# Patient Record
Sex: FEMALE | Race: WHITE | NOT HISPANIC OR LATINO
[De-identification: names, ages, dates, MRNs, and addresses within clinical notes are randomized per-mention and may not be internally consistent; named-entity substitution may affect disease eponyms.]

---

## 2021-10-01 ENCOUNTER — TRANSCRIPTION ENCOUNTER (OUTPATIENT)
Age: 56
End: 2021-10-01

## 2021-10-07 ENCOUNTER — RESULT REVIEW (OUTPATIENT)
Age: 56
End: 2021-10-07

## 2021-10-07 ENCOUNTER — OUTPATIENT (OUTPATIENT)
Dept: OUTPATIENT SERVICES | Facility: HOSPITAL | Age: 56
LOS: 1 days | End: 2021-10-07
Payer: MEDICAID

## 2021-10-07 DIAGNOSIS — C50.419 MALIGNANT NEOPLASM OF UPPER-OUTER QUADRANT OF UNSPECIFIED FEMALE BREAST: ICD-10-CM

## 2021-10-07 PROCEDURE — 88321 CONSLTJ&REPRT SLD PREP ELSWR: CPT

## 2021-10-08 LAB — SURGICAL PATHOLOGY STUDY: SIGNIFICANT CHANGE UP

## 2021-10-10 ENCOUNTER — TRANSCRIPTION ENCOUNTER (OUTPATIENT)
Age: 56
End: 2021-10-10

## 2021-10-11 ENCOUNTER — OUTPATIENT (OUTPATIENT)
Dept: OUTPATIENT SERVICES | Facility: HOSPITAL | Age: 56
LOS: 1 days | Discharge: HOME | End: 2021-10-11
Payer: MEDICAID

## 2021-10-11 ENCOUNTER — OUTPATIENT (OUTPATIENT)
Dept: OUTPATIENT SERVICES | Facility: HOSPITAL | Age: 56
LOS: 1 days | End: 2021-10-11
Payer: MEDICAID

## 2021-10-11 ENCOUNTER — RESULT REVIEW (OUTPATIENT)
Age: 56
End: 2021-10-11

## 2021-10-11 PROCEDURE — 76098 X-RAY EXAM SURGICAL SPECIMEN: CPT

## 2021-10-11 PROCEDURE — 78195 LYMPH SYSTEM IMAGING: CPT | Mod: 26

## 2021-10-11 PROCEDURE — 88342 IMHCHEM/IMCYTCHM 1ST ANTB: CPT | Mod: 26

## 2021-10-11 PROCEDURE — 88307 TISSUE EXAM BY PATHOLOGIST: CPT | Mod: 26

## 2021-10-11 PROCEDURE — 76098 X-RAY EXAM SURGICAL SPECIMEN: CPT | Mod: 26

## 2021-10-11 PROCEDURE — A9541: CPT

## 2021-10-11 PROCEDURE — 78195 LYMPH SYSTEM IMAGING: CPT

## 2021-10-11 PROCEDURE — 88341 IMHCHEM/IMCYTCHM EA ADD ANTB: CPT | Mod: 26

## 2021-10-11 RX ORDER — OXYCODONE AND ACETAMINOPHEN 5; 325 MG/1; MG/1
1 TABLET ORAL
Qty: 10 | Refills: 0
Start: 2021-10-11 | End: 2021-10-12

## 2021-10-16 LAB — SURGICAL PATHOLOGY STUDY: SIGNIFICANT CHANGE UP

## 2021-10-25 PROBLEM — Z00.00 ENCOUNTER FOR PREVENTIVE HEALTH EXAMINATION: Status: ACTIVE | Noted: 2021-10-25

## 2021-11-02 ENCOUNTER — NON-APPOINTMENT (OUTPATIENT)
Age: 56
End: 2021-11-02

## 2021-11-03 ENCOUNTER — APPOINTMENT (OUTPATIENT)
Dept: RADIATION ONCOLOGY | Facility: CLINIC | Age: 56
End: 2021-11-03
Payer: MEDICAID

## 2021-11-03 DIAGNOSIS — Z80.0 FAMILY HISTORY OF MALIGNANT NEOPLASM OF DIGESTIVE ORGANS: ICD-10-CM

## 2021-11-03 DIAGNOSIS — Z78.9 OTHER SPECIFIED HEALTH STATUS: ICD-10-CM

## 2021-11-03 PROCEDURE — 99204 OFFICE O/P NEW MOD 45 MIN: CPT | Mod: 25

## 2021-11-04 ENCOUNTER — FORM ENCOUNTER (OUTPATIENT)
Age: 56
End: 2021-11-04

## 2021-11-04 PROBLEM — Z80.0 FAMILY HISTORY OF MALIGNANT NEOPLASM OF STOMACH: Status: ACTIVE | Noted: 2021-11-04

## 2021-11-04 PROBLEM — Z78.9 NO PERTINENT PAST MEDICAL HISTORY: Status: RESOLVED | Noted: 2021-11-04 | Resolved: 2021-11-04

## 2021-11-07 NOTE — HISTORY OF PRESENT ILLNESS
[FreeTextEntry1] : 54 y/o female with h/o right breast lumpectomy 10/2021 for invasive ductal carcinoma presents to discuss radiation options.\par \par Initially mammogram and US showed suspicious area in right breast.  \par \par 9/2021 --- MRI showed right breast lesion BI-RADS 6. \par \par 10/11/2021 -- right lumpectomy showed invasive ductal carcinoma. Overall grade 1. pT1aN0.  ER positive, [place] negative, Her2 negative.  Breast surgeon Dr Lopez\par Final Diagnosis\par 1. Right sentinel lymph node #1, resection: - Two lymph node, negative for carcinoma (0/2). Note: Cytokeratin stain is negative supporting the diagnosis.\par 2. Right sentinel lymph node # 2, resection: - One lymph node, negative for carcinoma (0/1). Note: Cytokeratin stain is negative supporting the diagnosis.\par 3. Right sentinel lymph node #3, resection: - One lymph node, negative for carcinoma (0/1). Note: Cytokeratin stain is negative supporting the diagnosis.\par 4. Breast, right, lumpectomy: - Invasive duct carcinoma, well-differentiated, 0.45 cm. - The carcinoma is seen close to previous biopsy site. - Lymphovascular invasion is not seen. - All resection margins are negative for carcinoma, >5mm. - Atypical lobular hyperplasia. - Fibrocystic changes. - Fibroadenoma. - Microcalcifications are seen associated with benign ducts.\par \par \par

## 2021-11-07 NOTE — PHYSICAL EXAM
[Normal] : oriented to person, place and time, the affect was normal, the mood was normal and not anxious [de-identified] : R breast incision clean

## 2021-11-10 ENCOUNTER — NON-APPOINTMENT (OUTPATIENT)
Age: 56
End: 2021-11-10

## 2021-12-03 ENCOUNTER — NON-APPOINTMENT (OUTPATIENT)
Age: 56
End: 2021-12-03

## 2021-12-09 ENCOUNTER — NON-APPOINTMENT (OUTPATIENT)
Age: 56
End: 2021-12-09

## 2021-12-15 NOTE — REASON FOR VISIT
[Routine On-Treatment] : a routine on-treatment visit for [Breast Cancer] : breast cancer [[employer] ] : provided by [employer]   [Interpreters_IDNumber] : [id number] [TWNoteComboBox1] : [place]

## 2021-12-15 NOTE — HISTORY OF PRESENT ILLNESS
[FreeTextEntry1] : 54 y/o female with h/o right breast lumpectomy 10/2021 for invasive ductal carcinoma presents to discuss radiation options. Radiation planned for 5240 cGy in 20 Fx. \par \par Initially mammogram and US showed suspicious area in right breast.  \par \par 9/2021 --- MRI showed right breast lesion BI-RADS 6. \par \par 10/11/2021 -- right lumpectomy showed invasive ductal carcinoma. Overall grade 1. pT1aN0.  ER positive, [place] negative, Her2 negative.  Breast surgeon Dr Lopez\par Final Diagnosis\par 1. Right sentinel lymph node #1, resection: - Two lymph node, negative for carcinoma (0/2). Note: Cytokeratin stain is negative supporting the diagnosis.\par 2. Right sentinel lymph node # 2, resection: - One lymph node, negative for carcinoma (0/1). Note: Cytokeratin stain is negative supporting the diagnosis.\par 3. Right sentinel lymph node #3, resection: - One lymph node, negative for carcinoma (0/1). Note: Cytokeratin stain is negative supporting the diagnosis.\par 4. Breast, right, lumpectomy: - Invasive duct carcinoma, well-differentiated, 0.45 cm. - The carcinoma is seen close to previous biopsy site. - Lymphovascular invasion is not seen. - All resection margins are negative for carcinoma, >5mm. - Atypical lobular hyperplasia. - Fibrocystic changes. - Fibroadenoma. - Microcalcifications are seen associated with benign ducts.\par \par 9/20 fractions of radiation to right breast completed. Using Calendula cream over RT area. No cough, chest pain. \par \par \par

## 2021-12-15 NOTE — REVIEW OF SYSTEMS
[Dysphagia: Grade 0] : Dysphagia: Grade 0 [Cough: Grade 0] : Cough: Grade 0 [Dyspnea: Grade 0] : Dyspnea: Grade 0 [Alopecia: Grade 0] : Alopecia: Grade 0 [Pruritus: Grade 0] : Pruritus: Grade 0 [Skin Atrophy: Grade 0] : Skin Atrophy: Grade 0 [Skin Hyperpigmentation: Grade 0] : Skin Hyperpigmentation: Grade 0 [Skin Induration: Grade 0] : Skin Induration: Grade 0 [Dermatitis Radiation: Grade 0] : Dermatitis Radiation: Grade 0 [Negative] : Allergic/Immunologic [de-identified] : right breast lumpectomy for cancer 10/2021

## 2021-12-15 NOTE — DISEASE MANAGEMENT
[Clinical] : TNM Stage: c [I] : I [TTNM] : 1 [NTNM] : 0 [MTNM] : 0 [de-identified] : 5240 cGy [de-identified] : right breast

## 2021-12-16 ENCOUNTER — NON-APPOINTMENT (OUTPATIENT)
Age: 56
End: 2021-12-16

## 2021-12-21 NOTE — DISEASE MANAGEMENT
[Clinical] : TNM Stage: c [I] : I [TTNM] : 1 [NTNM] : 0 [MTNM] : 0 [de-identified] : 5240 cGy [de-identified] : right breast

## 2021-12-21 NOTE — DISEASE MANAGEMENT
[Clinical] : TNM Stage: c [I] : I [TTNM] : 1 [NTNM] : 0 [MTNM] : 0 [de-identified] : 5240 cGy [de-identified] : right breast

## 2021-12-21 NOTE — REVIEW OF SYSTEMS
[Dysphagia: Grade 0] : Dysphagia: Grade 0 [Cough: Grade 0] : Cough: Grade 0 [Dyspnea: Grade 0] : Dyspnea: Grade 0 [Alopecia: Grade 0] : Alopecia: Grade 0 [Pruritus: Grade 0] : Pruritus: Grade 0 [Skin Atrophy: Grade 0] : Skin Atrophy: Grade 0 [Skin Hyperpigmentation: Grade 0] : Skin Hyperpigmentation: Grade 0 [Skin Induration: Grade 0] : Skin Induration: Grade 0 [Dermatitis Radiation: Grade 0] : Dermatitis Radiation: Grade 0 [Negative] : Allergic/Immunologic [de-identified] : right breast lumpectomy for cancer 10/2021

## 2021-12-21 NOTE — REVIEW OF SYSTEMS
[Dysphagia: Grade 0] : Dysphagia: Grade 0 [Cough: Grade 0] : Cough: Grade 0 [Dyspnea: Grade 0] : Dyspnea: Grade 0 [Alopecia: Grade 0] : Alopecia: Grade 0 [Pruritus: Grade 0] : Pruritus: Grade 0 [Skin Atrophy: Grade 0] : Skin Atrophy: Grade 0 [Skin Hyperpigmentation: Grade 1 - Hyperpigmentation covering <10% BSA; no psychosocial impact] : Skin Hyperpigmentation: Grade 1 - Hyperpigmentation covering <10% BSA; no psychosocial impact [Skin Induration: Grade 0] : Skin Induration: Grade 0 [Dermatitis Radiation: Grade 1 - Faint erythema or dry desquamation] : Dermatitis Radiation: Grade 1 - Faint erythema or dry desquamation [Negative] : Allergic/Immunologic [de-identified] : right breast lumpectomy for cancer 10/2021

## 2021-12-21 NOTE — REASON FOR VISIT
[Routine On-Treatment] : a routine on-treatment visit for [Breast Cancer] : breast cancer [[employer] ] : provided by [employer]   [Interpreters_IDNumber] : [id number] [TWNoteComboBox1] : [place]

## 2021-12-21 NOTE — HISTORY OF PRESENT ILLNESS
[FreeTextEntry1] : 56 y/o female with h/o right breast lumpectomy 10/2021 for invasive ductal carcinoma presents to discuss radiation options. Radiation planned for 5240 cGy in 20 Fx. \par \par Initially mammogram and US showed suspicious area in right breast.  \par \par 9/2021 --- MRI showed right breast lesion BI-RADS 6. \par \par 10/11/2021 -- right lumpectomy showed invasive ductal carcinoma. Overall grade 1. pT1aN0.  ER positive, [place] negative, Her2 negative.  Breast surgeon Dr Lopez\par Final Diagnosis\par 1. Right sentinel lymph node #1, resection: - Two lymph node, negative for carcinoma (0/2). Note: Cytokeratin stain is negative supporting the diagnosis.\par 2. Right sentinel lymph node # 2, resection: - One lymph node, negative for carcinoma (0/1). Note: Cytokeratin stain is negative supporting the diagnosis.\par 3. Right sentinel lymph node #3, resection: - One lymph node, negative for carcinoma (0/1). Note: Cytokeratin stain is negative supporting the diagnosis.\par 4. Breast, right, lumpectomy: - Invasive duct carcinoma, well-differentiated, 0.45 cm. - The carcinoma is seen close to previous biopsy site. - Lymphovascular invasion is not seen. - All resection margins are negative for carcinoma, >5mm. - Atypical lobular hyperplasia. - Fibrocystic changes. - Fibroadenoma. - Microcalcifications are seen associated with benign ducts.\par \par 14/20 fractions of radiation to right breast completed. Using Calendula cream over RT area. No cough, chest pain, breast pain.\par \par \par

## 2021-12-21 NOTE — REASON FOR VISIT
[Routine On-Treatment] : a routine on-treatment visit for [Breast Cancer] : breast cancer [[employer] ] : provided by [employer]   [Interpreters_IDNumber] : [id number] [TWNoteComboBox1] : [place]

## 2021-12-21 NOTE — HISTORY OF PRESENT ILLNESS
[FreeTextEntry1] : 54 y/o female with h/o right breast lumpectomy 10/2021 for invasive ductal carcinoma presents to discuss radiation options. Radiation planned for 5240 cGy in 20 Fx. \par \par Initially mammogram and US showed suspicious area in right breast.  \par \par 9/2021 --- MRI showed right breast lesion BI-RADS 6. \par \par 10/11/2021 -- right lumpectomy showed invasive ductal carcinoma. Overall grade 1. pT1aN0.  ER positive, [place] negative, Her2 negative.  Breast surgeon Dr Lopez\par Final Diagnosis\par 1. Right sentinel lymph node #1, resection: - Two lymph node, negative for carcinoma (0/2). Note: Cytokeratin stain is negative supporting the diagnosis.\par 2. Right sentinel lymph node # 2, resection: - One lymph node, negative for carcinoma (0/1). Note: Cytokeratin stain is negative supporting the diagnosis.\par 3. Right sentinel lymph node #3, resection: - One lymph node, negative for carcinoma (0/1). Note: Cytokeratin stain is negative supporting the diagnosis.\par 4. Breast, right, lumpectomy: - Invasive duct carcinoma, well-differentiated, 0.45 cm. - The carcinoma is seen close to previous biopsy site. - Lymphovascular invasion is not seen. - All resection margins are negative for carcinoma, >5mm. - Atypical lobular hyperplasia. - Fibrocystic changes. - Fibroadenoma. - Microcalcifications are seen associated with benign ducts.\par \par 9/20 fractions of radiation to right breast completed. Using Calendula cream over RT area. No cough, chest pain. \par \par \par

## 2021-12-23 ENCOUNTER — NON-APPOINTMENT (OUTPATIENT)
Age: 56
End: 2021-12-23

## 2021-12-26 ENCOUNTER — FORM ENCOUNTER (OUTPATIENT)
Age: 56
End: 2021-12-26

## 2021-12-26 NOTE — REASON FOR VISIT
[Routine On-Treatment] : a routine on-treatment visit for [Breast Cancer] : breast cancer [[employer] ] : provided by [employer]   [Interpreters_IDNumber] : [id number] [TWNoteComboBox1] : French

## 2021-12-26 NOTE — DISEASE MANAGEMENT
[Clinical] : TNM Stage: c [I] : I [TTNM] : 1 [NTNM] : 0 [MTNM] : 0 [de-identified] : 5240 cGy [de-identified] : right breast

## 2021-12-26 NOTE — REVIEW OF SYSTEMS
[Dysphagia: Grade 0] : Dysphagia: Grade 0 [Cough: Grade 0] : Cough: Grade 0 [Dyspnea: Grade 0] : Dyspnea: Grade 0 [Alopecia: Grade 0] : Alopecia: Grade 0 [Pruritus: Grade 0] : Pruritus: Grade 0 [Skin Atrophy: Grade 0] : Skin Atrophy: Grade 0 [Skin Hyperpigmentation: Grade 1 - Hyperpigmentation covering <10% BSA; no psychosocial impact] : Skin Hyperpigmentation: Grade 1 - Hyperpigmentation covering <10% BSA; no psychosocial impact [Skin Induration: Grade 0] : Skin Induration: Grade 0 [Dermatitis Radiation: Grade 1 - Faint erythema or dry desquamation] : Dermatitis Radiation: Grade 1 - Faint erythema or dry desquamation [Negative] : Allergic/Immunologic [de-identified] : right breast lumpectomy for cancer 10/2021

## 2021-12-26 NOTE — HISTORY OF PRESENT ILLNESS
[FreeTextEntry1] : 54 y/o female with h/o right breast lumpectomy 10/2021 for invasive ductal carcinoma presents to discuss radiation options. Radiation planned for 5240 cGy in 20 Fx. \par \par Initially mammogram and US showed suspicious area in right breast.  \par \par 9/2021 --- MRI showed right breast lesion BI-RADS 6. \par \par 10/11/2021 -- right lumpectomy showed invasive ductal carcinoma. Overall grade 1. pT1aN0.  ER positive, MD negative, Her2 negative.  Breast surgeon Dr Lopez\par Final Diagnosis\par 1. Right sentinel lymph node #1, resection: - Two lymph node, negative for carcinoma (0/2). Note: Cytokeratin stain is negative supporting the diagnosis.\par 2. Right sentinel lymph node # 2, resection: - One lymph node, negative for carcinoma (0/1). Note: Cytokeratin stain is negative supporting the diagnosis.\par 3. Right sentinel lymph node #3, resection: - One lymph node, negative for carcinoma (0/1). Note: Cytokeratin stain is negative supporting the diagnosis.\par 4. Breast, right, lumpectomy: - Invasive duct carcinoma, well-differentiated, 0.45 cm. - The carcinoma is seen close to previous biopsy site. - Lymphovascular invasion is not seen. - All resection margins are negative for carcinoma, >5mm. - Atypical lobular hyperplasia. - Fibrocystic changes. - Fibroadenoma. - Microcalcifications are seen associated with benign ducts.\par \par 19/20 fractions of radiation to right breast completed. Using Calendula cream over RT area. No cough, chest pain, breast pain.\par \par \par

## 2021-12-27 ENCOUNTER — NON-APPOINTMENT (OUTPATIENT)
Age: 56
End: 2021-12-27

## 2022-01-04 NOTE — REVIEW OF SYSTEMS
[Dysphagia: Grade 0] : Dysphagia: Grade 0 [Cough: Grade 0] : Cough: Grade 0 [Dyspnea: Grade 0] : Dyspnea: Grade 0 [Alopecia: Grade 0] : Alopecia: Grade 0 [Pruritus: Grade 0] : Pruritus: Grade 0 [Skin Atrophy: Grade 0] : Skin Atrophy: Grade 0 [Skin Hyperpigmentation: Grade 1 - Hyperpigmentation covering <10% BSA; no psychosocial impact] : Skin Hyperpigmentation: Grade 1 - Hyperpigmentation covering <10% BSA; no psychosocial impact [Skin Induration: Grade 0] : Skin Induration: Grade 0 [Dermatitis Radiation: Grade 1 - Faint erythema or dry desquamation] : Dermatitis Radiation: Grade 1 - Faint erythema or dry desquamation [Negative] : Allergic/Immunologic [de-identified] : right breast lumpectomy for cancer 10/2021

## 2022-01-04 NOTE — DISEASE MANAGEMENT
[Clinical] : TNM Stage: c [I] : I [TTNM] : 1 [NTNM] : 0 [MTNM] : 0 [de-identified] : 5240 cGy [de-identified] : right breast

## 2022-01-04 NOTE — HISTORY OF PRESENT ILLNESS
[FreeTextEntry1] : 56 y/o female with h/o right breast lumpectomy 10/2021 for invasive ductal carcinoma presents to discuss radiation options. Radiation planned for 5240 cGy in 20 Fx. \par \par Initially mammogram and US showed suspicious area in right breast.  \par \par 9/2021 --- MRI showed right breast lesion BI-RADS 6. \par \par 10/11/2021 -- right lumpectomy showed invasive ductal carcinoma. Overall grade 1. pT1aN0.  ER positive, [place] negative, Her2 negative.  Breast surgeon Dr Lopez\par Final Diagnosis\par 1. Right sentinel lymph node #1, resection: - Two lymph node, negative for carcinoma (0/2). Note: Cytokeratin stain is negative supporting the diagnosis.\par 2. Right sentinel lymph node # 2, resection: - One lymph node, negative for carcinoma (0/1). Note: Cytokeratin stain is negative supporting the diagnosis.\par 3. Right sentinel lymph node #3, resection: - One lymph node, negative for carcinoma (0/1). Note: Cytokeratin stain is negative supporting the diagnosis.\par 4. Breast, right, lumpectomy: - Invasive duct carcinoma, well-differentiated, 0.45 cm. - The carcinoma is seen close to previous biopsy site. - Lymphovascular invasion is not seen. - All resection margins are negative for carcinoma, >5mm. - Atypical lobular hyperplasia. - Fibrocystic changes. - Fibroadenoma. - Microcalcifications are seen associated with benign ducts.\par \par 20/20 fractions of radiation to right breast completed. Using Calendula cream over RT area. No cough, chest pain, breast pain.\par \par \par

## 2022-01-04 NOTE — REASON FOR VISIT
[Routine On-Treatment] : a routine on-treatment visit for [Breast Cancer] : breast cancer [[employer] ] : provided by [employer]   [Interpreters_IDNumber] : [id number] [TWNoteComboBox1] : Mozambican

## 2022-01-10 ENCOUNTER — TRANSCRIPTION ENCOUNTER (OUTPATIENT)
Age: 57
End: 2022-01-10

## 2022-01-15 ENCOUNTER — TRANSCRIPTION ENCOUNTER (OUTPATIENT)
Age: 57
End: 2022-01-15

## 2022-02-01 ENCOUNTER — APPOINTMENT (OUTPATIENT)
Dept: RADIATION ONCOLOGY | Facility: CLINIC | Age: 57
End: 2022-02-01
Payer: MEDICAID

## 2022-02-01 PROCEDURE — 99024 POSTOP FOLLOW-UP VISIT: CPT

## 2022-02-01 RX ORDER — ANASTROZOLE TABLETS 1 MG/1
1 TABLET ORAL
Refills: 0 | Status: ACTIVE | COMMUNITY

## 2022-02-08 NOTE — DISEASE MANAGEMENT
[Clinical] : TNM Stage: c [I] : I [TTNM] : 1 [NTNM] : 0 [MTNM] : 0 [de-identified] : 5240 cGy [de-identified] : right breast

## 2022-02-08 NOTE — REASON FOR VISIT
[Home] : at home, [unfilled] , at the time of the visit. [Medical Office: ([facility])___] : at the medical office located in  [Verbal consent obtained from patient] : the patient, [unfilled] [Post-Treatment Evaluation] : post-treatment evaluation for [Breast Cancer] : breast cancer [] :  [[employer] ] : provided by [employer]   [Time Spent: ____ minutes] : Total time spent using  services: [unfilled] minutes. The patient's primary language is not English thus required  services. [Interpreters_FullName] : Lorin [Interpreters_IDNumber] : [id number] [TWNoteComboBox1] : Qatari

## 2022-02-08 NOTE — HISTORY OF PRESENT ILLNESS
[FreeTextEntry1] : 56 y/o female with h/o right breast lumpectomy 10/2021 for invasive ductal carcinoma, and radiation treatment to right breast in 12/2021 presents for f/u\par \par Initially mammogram and US showed suspicious area in right breast.  \par \par 9/2021 --- MRI showed right breast lesion BI-RADS 6. \par \par 10/11/2021 -- right lumpectomy showed invasive ductal carcinoma. Overall grade 1. pT1aN0.  ER positive, [place] negative, Her2 negative.  Breast surgeon Dr Lopez\par Final Diagnosis\par 1. Right sentinel lymph node #1, resection: - Two lymph node, negative for carcinoma (0/2). Note: Cytokeratin stain is negative supporting the diagnosis.\par 2. Right sentinel lymph node # 2, resection: - One lymph node, negative for carcinoma (0/1). Note: Cytokeratin stain is negative supporting the diagnosis.\par 3. Right sentinel lymph node #3, resection: - One lymph node, negative for carcinoma (0/1). Note: Cytokeratin stain is negative supporting the diagnosis.\par 4. Breast, right, lumpectomy: - Invasive duct carcinoma, well-differentiated, 0.45 cm. - The carcinoma is seen close to previous biopsy site. - Lymphovascular invasion is not seen. - All resection margins are negative for carcinoma, >5mm. - Atypical lobular hyperplasia. - Fibrocystic changes. - Fibroadenoma. - Microcalcifications are seen associated with benign ducts.\par \par Last OTV note during Radiation in 12/2021: 20/20 fractions of radiation to right breast completed. Using Calendula cream over RT area. No cough, chest pain, breast pain.\par \par She completed 5240 cGy in 20 Fx of radiation to right breast in 12/2021.  \par \par 2/1/22- Patient feels well- denies fatigue and pain to her R breast. Also denies coughing, chest pain, breast pain, dysphagia. She states about two weeks ago still had some mild erythema, itching of the skin, and dark spots on the site of radiation. Currently all of these symptoms have resolved. She continues on anastrozole with Dr. Audie Pinon and has a followup visit to see him tomorrow. \par \par \par

## 2022-02-08 NOTE — REVIEW OF SYSTEMS
[Negative] : Allergic/Immunologic [Dysphagia: Grade 0] : Dysphagia: Grade 0 [Cough: Grade 0] : Cough: Grade 0 [Dyspnea: Grade 0] : Dyspnea: Grade 0 [Alopecia: Grade 0] : Alopecia: Grade 0 [Pruritus: Grade 0] : Pruritus: Grade 0 [Skin Atrophy: Grade 0] : Skin Atrophy: Grade 0 [Skin Induration: Grade 0] : Skin Induration: Grade 0 [Skin Hyperpigmentation: Grade 0] : Skin Hyperpigmentation: Grade 0 [Dermatitis Radiation: Grade 0] : Dermatitis Radiation: Grade 0 [de-identified] : right breast lumpectomy for cancer 10/2021

## 2022-05-03 ENCOUNTER — NON-APPOINTMENT (OUTPATIENT)
Age: 57
End: 2022-05-03

## 2022-05-03 ENCOUNTER — APPOINTMENT (OUTPATIENT)
Dept: RADIATION ONCOLOGY | Facility: CLINIC | Age: 57
End: 2022-05-03
Payer: MEDICAID

## 2022-05-03 PROCEDURE — 99443: CPT

## 2022-05-09 NOTE — HISTORY OF PRESENT ILLNESS
[FreeTextEntry1] : 54 y/o female with h/o right breast lumpectomy 10/2021 for invasive ductal carcinoma, and radiation treatment to right breast in 12/2021 presents for f/u\par \par Initially mammogram and US showed suspicious area in right breast.  \par \par 9/2021 --- MRI showed right breast lesion BI-RADS 6. \par \par 10/11/2021 -- right lumpectomy showed invasive ductal carcinoma. Overall grade 1. pT1aN0.  ER positive, [place] negative, Her2 negative.  Breast surgeon Dr Lopez\par Final Diagnosis\par 1. Right sentinel lymph node #1, resection: - Two lymph node, negative for carcinoma (0/2). Note: Cytokeratin stain is negative supporting the diagnosis.\par 2. Right sentinel lymph node # 2, resection: - One lymph node, negative for carcinoma (0/1). Note: Cytokeratin stain is negative supporting the diagnosis.\par 3. Right sentinel lymph node #3, resection: - One lymph node, negative for carcinoma (0/1). Note: Cytokeratin stain is negative supporting the diagnosis.\par 4. Breast, right, lumpectomy: - Invasive duct carcinoma, well-differentiated, 0.45 cm. - The carcinoma is seen close to previous biopsy site. - Lymphovascular invasion is not seen. - All resection margins are negative for carcinoma, >5mm. - Atypical lobular hyperplasia. - Fibrocystic changes. - Fibroadenoma. - Microcalcifications are seen associated with benign ducts.\par \par Last OTV note during Radiation in 12/2021: 20/20 fractions of radiation to right breast completed. Using Calendula cream over RT area. No cough, chest pain, breast pain.\par \par She completed 5240 cGy in 20 Fx of radiation to right breast in 12/2021.  \par \par 2/1/22- Patient feels well- denies fatigue and pain to her R breast. Also denies coughing, chest pain, breast pain, dysphagia.  She continues on anastrozole with Dr. Audie Levin @ [facility] in [facility] (phone [phone]).\par \par

## 2022-11-02 ENCOUNTER — NON-APPOINTMENT (OUTPATIENT)
Age: 57
End: 2022-11-02

## 2022-11-28 ENCOUNTER — NON-APPOINTMENT (OUTPATIENT)
Age: 57
End: 2022-11-28